# Patient Record
Sex: FEMALE | Race: WHITE | NOT HISPANIC OR LATINO | Employment: FULL TIME | URBAN - METROPOLITAN AREA
[De-identification: names, ages, dates, MRNs, and addresses within clinical notes are randomized per-mention and may not be internally consistent; named-entity substitution may affect disease eponyms.]

---

## 2017-12-20 ENCOUNTER — OFFICE VISIT (OUTPATIENT)
Dept: OBSTETRICS AND GYNECOLOGY | Age: 47
End: 2017-12-20

## 2017-12-20 VITALS
SYSTOLIC BLOOD PRESSURE: 112 MMHG | HEIGHT: 65 IN | DIASTOLIC BLOOD PRESSURE: 64 MMHG | WEIGHT: 125 LBS | BODY MASS INDEX: 20.83 KG/M2

## 2017-12-20 DIAGNOSIS — Z01.419 WELL WOMAN EXAM WITH ROUTINE GYNECOLOGICAL EXAM: Primary | ICD-10-CM

## 2017-12-20 PROCEDURE — 99396 PREV VISIT EST AGE 40-64: CPT | Performed by: PHYSICIAN ASSISTANT

## 2017-12-20 NOTE — PROGRESS NOTES
Subjective     Chief Complaint   Patient presents with   • Annual Exam     PT HERE FOR ROUTINE AE AND MG. SHE IS DOING WELL WITH NO  COMPLAINTS.       History of Present Illness    Elizabeth García is a 47 y.o.  who presents for annual exam.    She is in town for the holidays, she is still living in [place] but moving back in the summer. She has no change in her med hx. She is staying active with biking and walking, but not running as much. Kids are 14, 12 and 9.  All girls. Oldest started her period and they are heavy. Mom doesn't want to do BCP but interested to know alternative ways to help her. We discussed routine ibuprofen.     She is a pt of Dr Keith    Her menses are regular every 28-30 days, lasting 4-7 days, dysmenorrhea none   Obstetric History:  OB History      Para Term  AB Living    3 2 2   3    SAB TAB Ectopic Multiple Live Births        3         Menstrual History:     Patient's last menstrual period was 2017 (exact date).         Current contraception: vasectomy  History of abnormal Pap smear:   Received Gardasil immunization: no  Perform regular self breast exam: yes - mthly  Family history of uterine or ovarian cancer: no  Family History of colon cancer: no  Family history of breast cancer: no    Mammogram: done today.  Colonoscopy: not indicated.  DEXA: not indicated.    Exercise: moderately active  Calcium/Vitamin D: adequate intake and supplement    The following portions of the patient's history were reviewed and updated as appropriate: allergies, current medications, past family history, past medical history, past social history, past surgical history and problem list.    Review of Systems   All other systems reviewed and are negative.      Review of Systems   Constitutional: Negative for fatigue.   Respiratory: Negative for shortness of breath.    Gastrointestinal: Negative for abdominal pain.   Genitourinary: Negative for dysuria.   Neurological: Negative for  "headaches.   Psychiatric/Behavioral: Negative for dysphoric mood.         Objective   Physical Exam    /64  Ht 165.1 cm (65\")  Wt 56.7 kg (125 lb)  LMP 12/01/2017 (Exact Date)  BMI 20.8 kg/m2    General:   alert, appears stated age, cachectic and cooperative   Neck: no adenopathy and thyroid normal to palpation   Heart: regular rate and rhythm   Lungs: clear to auscultation bilaterally   Abdomen: soft and nontender   Breast: inspection negative, no nipple discharge or bleeding, no masses or nodularity palpable   Vulva: normal   Vagina: normal mucosa, normal discharge   Cervix: no lesions   Uterus: normal size, non-tender   Adnexa: normal adnexa and no mass, fullness, tenderness   Rectal: not indicated     Assessment/Plan   Elizabeth was seen today for annual exam.    Diagnoses and all orders for this visit:    Well woman exam with routine gynecological exam  -     Pap IG, Rfx HPV ASCU - ThinPrep Vial, Cervix  -     CBC & Differential  -     Comprehensive Metabolic Panel  -     Lipid Panel  -     TSH  -     Vitamin B12  -     Folate        All questions answered.  Breast self exam technique reviewed and patient encouraged to perform self-exam monthly.  Discussed healthy lifestyle modifications.  Recommended 30 minutes of aerobic exercise five times per week.  Discussed calcium needs to prevent osteoporosis.    Disc Pap guidelines, she would like to do a pap this year  Check HM labs, she is going to establish with a PCP on Friday, Wong?  I advised her to connect with [facility] so she will have lab results available for her appt             "

## 2017-12-21 LAB
ALBUMIN SERPL-MCNC: 4.9 G/DL (ref 3.5–5.5)
ALBUMIN/GLOB SERPL: 2 {RATIO} (ref 1.2–2.2)
ALP SERPL-CCNC: 61 IU/L (ref 39–117)
ALT SERPL-CCNC: 10 IU/L (ref 0–32)
AST SERPL-CCNC: 21 IU/L (ref 0–40)
BASOPHILS # BLD AUTO: 0 X10E3/UL (ref 0–0.2)
BASOPHILS NFR BLD AUTO: 1 %
BILIRUB SERPL-MCNC: 0.8 MG/DL (ref 0–1.2)
BUN SERPL-MCNC: 14 MG/DL (ref 6–24)
BUN/CREAT SERPL: 18 (ref 9–23)
CALCIUM SERPL-MCNC: 9.4 MG/DL (ref 8.7–10.2)
CHLORIDE SERPL-SCNC: 104 MMOL/L (ref 96–106)
CHOLEST SERPL-MCNC: 198 MG/DL (ref 100–199)
CO2 SERPL-SCNC: 14 MMOL/L (ref 18–29)
CREAT SERPL-MCNC: 0.77 MG/DL (ref 0.57–1)
EOSINOPHIL # BLD AUTO: 0.1 X10E3/UL (ref 0–0.4)
EOSINOPHIL NFR BLD AUTO: 2 %
ERYTHROCYTE [DISTWIDTH] IN BLOOD BY AUTOMATED COUNT: 13.5 % (ref 12.3–15.4)
FOLATE SERPL-MCNC: >20 NG/ML
GLOBULIN SER CALC-MCNC: 2.4 G/DL (ref 1.5–4.5)
GLUCOSE SERPL-MCNC: 77 MG/DL (ref 65–99)
HCT VFR BLD AUTO: 45.3 % (ref 34–46.6)
HDLC SERPL-MCNC: 71 MG/DL
HGB BLD-MCNC: 16 G/DL (ref 11.1–15.9)
IMM GRANULOCYTES # BLD: 0 X10E3/UL (ref 0–0.1)
IMM GRANULOCYTES NFR BLD: 0 %
LDLC SERPL CALC-MCNC: 108 MG/DL (ref 0–99)
LYMPHOCYTES # BLD AUTO: 2.2 X10E3/UL (ref 0.7–3.1)
LYMPHOCYTES NFR BLD AUTO: 33 %
MCH RBC QN AUTO: 33.5 PG (ref 26.6–33)
MCHC RBC AUTO-ENTMCNC: 35.3 G/DL (ref 31.5–35.7)
MCV RBC AUTO: 95 FL (ref 79–97)
MONOCYTES # BLD AUTO: 0.6 X10E3/UL (ref 0.1–0.9)
MONOCYTES NFR BLD AUTO: 9 %
NEUTROPHILS # BLD AUTO: 3.6 X10E3/UL (ref 1.4–7)
NEUTROPHILS NFR BLD AUTO: 55 %
PLATELET # BLD AUTO: 233 X10E3/UL (ref 150–379)
POTASSIUM SERPL-SCNC: 4.6 MMOL/L (ref 3.5–5.2)
PROT SERPL-MCNC: 7.3 G/DL (ref 6–8.5)
RBC # BLD AUTO: 4.77 X10E6/UL (ref 3.77–5.28)
SODIUM SERPL-SCNC: 143 MMOL/L (ref 134–144)
TRIGL SERPL-MCNC: 96 MG/DL (ref 0–149)
TSH SERPL DL<=0.005 MIU/L-ACNC: 1.58 UIU/ML (ref 0.45–4.5)
VIT B12 SERPL-MCNC: 1325 PG/ML (ref 232–1245)
VLDLC SERPL CALC-MCNC: 19 MG/DL (ref 5–40)
WBC # BLD AUTO: 6.5 X10E3/UL (ref 3.4–10.8)

## 2017-12-22 ENCOUNTER — TELEPHONE (OUTPATIENT)
Dept: OBSTETRICS AND GYNECOLOGY | Age: 47
End: 2017-12-22

## 2017-12-22 LAB
CONV .: NORMAL
CYTOLOGIST CVX/VAG CYTO: NORMAL
CYTOLOGY CVX/VAG DOC THIN PREP: NORMAL
DX ICD CODE: NORMAL
HIV 1 & 2 AB SER-IMP: NORMAL
OTHER STN SPEC: NORMAL
PATH REPORT.FINAL DX SPEC: NORMAL
STAT OF ADQ CVX/VAG CYTO-IMP: NORMAL

## 2017-12-22 NOTE — TELEPHONE ENCOUNTER
----- Message from KATHERINE Bansal sent at 12/22/2017 10:27 AM EST -----  Let her know her pap is wnl, her labs look good, CBC, CMP and thyroid, vitamin levels are all wnl.  Her B12 is high normal but not concerning. LDL level is slightly high, but lower then last year, I would just monitor diet and exercise and f/u with PCP

## 2020-01-29 ENCOUNTER — APPOINTMENT (OUTPATIENT)
Dept: WOMENS IMAGING | Facility: HOSPITAL | Age: 50
End: 2020-01-29

## 2020-01-29 ENCOUNTER — OFFICE VISIT (OUTPATIENT)
Dept: OBSTETRICS AND GYNECOLOGY | Age: 50
End: 2020-01-29

## 2020-01-29 ENCOUNTER — PROCEDURE VISIT (OUTPATIENT)
Dept: OBSTETRICS AND GYNECOLOGY | Age: 50
End: 2020-01-29

## 2020-01-29 VITALS
HEIGHT: 65 IN | WEIGHT: 126 LBS | BODY MASS INDEX: 20.99 KG/M2 | DIASTOLIC BLOOD PRESSURE: 62 MMHG | SYSTOLIC BLOOD PRESSURE: 102 MMHG

## 2020-01-29 DIAGNOSIS — Z12.4 SCREENING FOR CERVICAL CANCER: ICD-10-CM

## 2020-01-29 DIAGNOSIS — Z12.31 VISIT FOR SCREENING MAMMOGRAM: Primary | ICD-10-CM

## 2020-01-29 DIAGNOSIS — Z11.51 SCREENING FOR HPV (HUMAN PAPILLOMAVIRUS): ICD-10-CM

## 2020-01-29 DIAGNOSIS — N95.1 MENOPAUSAL SYMPTOMS: ICD-10-CM

## 2020-01-29 DIAGNOSIS — Z01.419 ENCOUNTER FOR GYNECOLOGICAL EXAMINATION (GENERAL) (ROUTINE) WITHOUT ABNORMAL FINDINGS: Primary | ICD-10-CM

## 2020-01-29 PROCEDURE — 99396 PREV VISIT EST AGE 40-64: CPT | Performed by: OBSTETRICS & GYNECOLOGY

## 2020-01-29 PROCEDURE — 77067 SCR MAMMO BI INCL CAD: CPT | Performed by: OBSTETRICS & GYNECOLOGY

## 2020-01-29 PROCEDURE — 77067 SCR MAMMO BI INCL CAD: CPT | Performed by: RADIOLOGY

## 2020-01-29 RX ORDER — VALACYCLOVIR HYDROCHLORIDE 1 G/1
TABLET, FILM COATED ORAL
COMMUNITY
Start: 2020-01-28 | End: 2022-07-07 | Stop reason: SDUPTHER

## 2020-01-29 RX ORDER — ESCITALOPRAM OXALATE 10 MG/1
10 TABLET ORAL DAILY
Qty: 30 TABLET | Refills: 2 | Status: SHIPPED | OUTPATIENT
Start: 2020-01-29 | End: 2021-01-28

## 2020-01-29 NOTE — PROGRESS NOTES
Routine Annual Visit    2020    Patient: Elizabeth García          MR#:[id number]      Chief Complaint   Patient presents with   • Gynecologic Exam     AE AND MG TODAY AFTER EXAM.   PAP = NEG. LAST CYCLE IN OCTOBER.  SYMPTOMS OF MENOPAUSE.  THREE GIRLS, DWAIN, NICHOLAS, AND JULES, 16, 14, AND 11.         History of Present Illness    49 y.o. female  who presents for annual exam.   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  Obstetric History:  OB History        3    Para   2    Term   2            AB        Living   3       SAB        TAB        Ectopic        Molar        Multiple        Live Births   3               Menstrual History:     Patient's last menstrual period was 10/16/2019.       Sexual History:       ________________________________________  There is no problem list on file for this patient.      Past Medical History:   Diagnosis Date   • Vaginal delivery        Past Surgical History:   Procedure Laterality Date   • APPENDECTOMY     • ELBOW ARTHROSCOPY     • FOOT SURGERY         Social History     Tobacco Use   Smoking Status Former Smoker   Smokeless Tobacco Never Used       has a current medication list which includes the following prescription(s): valacyclovir and escitalopram.  ________________________________________    Current contraception: vasectomy  History of abnormal Pap smear: no  Family history of Breast cancer: no  Family history of uterine or ovarian cancer: no  Family History of colon cancer/colon polyps: no  History of abnormal mammogram: no      The  "following portions of the patient's history were reviewed and updated as appropriate: allergies, current medications, past family history, past medical history, past social history, past surgical history and problem list.    Review of Systems    Pertinent items are noted in HPI.     Objective   Physical Exam    /62   Ht 165.1 cm (65\")   Wt 57.2 kg (126 lb)   LMP 10/16/2019   Breastfeeding No   BMI 20.97 kg/m²    BP Readings from Last 3 Encounters:   01/29/20 102/62   12/20/17 112/64   12/19/16 102/60      Wt Readings from Last 3 Encounters:   01/29/20 57.2 kg (126 lb)   12/20/17 56.7 kg (125 lb)   12/19/16 57.2 kg (126 lb)      BMI: Estimated body mass index is 20.97 kg/m² as calculated from the following:    Height as of this encounter: 165.1 cm (65\").    Weight as of this encounter: 57.2 kg (126 lb).      General:   alert, appears stated age and cooperative   Abdomen: soft, non-tender, without masses or organomegaly   Breast: inspection negative, no nipple discharge or bleeding, no masses or nodularity palpable   Vulva: normal   Vagina: normal mucosa   Cervix: no cervical motion tenderness and no lesions   Uterus: normal size, mobile or non-tender   Adnexa: no mass, fullness, tenderness     Assessment:    1. Normal annual exam   Assessment     ICD-10-CM ICD-9-CM   1. Encounter for gynecological examination (general) (routine) without abnormal findings Z01.419 V72.31   2. Menopausal symptoms N95.1 627.2   3. Screening for HPV (human papillomavirus) Z11.51 V73.81   4. Screening for cervical cancer Z12.4 V76.2     Plan:    Plan     [x]  Mammogram request made  [x]  PAP done  [x]  Labs:   []  GC/Chl/TV  []  DEXA scan   []  Referral for colonoscopy:       Elizabeth was seen today for gynecologic exam.    Diagnoses and all orders for this visit:    Encounter for gynecological examination (general) (routine) without abnormal findings    Menopausal symptoms  -     Follicle Stimulating Hormone    Screening for HPV " (human papillomavirus)  -     IGP, Apt HPV,rfx 16 / 18,45    Screening for cervical cancer  -     IGP, Apt HPV,rfx 16 / 18,45    Other orders  -     escitalopram (LEXAPRO) 10 MG tablet; Take 1 tablet by mouth Daily.            Counseling:  --Nutrition: Stressed importance of moderation and caloric balance, stressed fresh fruit and vegetables  --Exercise: Stressed the importance of regular exercise. 3-5 times weekly   - Discussed screening mammogram recommendations.   --Discussed benefits of screening colonoscopy- age 45 unless FH  --Discussed pap smear screening recommendations

## 2020-01-30 LAB — FSH SERPL-ACNC: 79.9 MIU/ML

## 2020-01-31 LAB
CYTOLOGIST CVX/VAG CYTO: NORMAL
CYTOLOGY CVX/VAG DOC CYTO: NORMAL
CYTOLOGY CVX/VAG DOC THIN PREP: NORMAL
DX ICD CODE: NORMAL
HIV 1 & 2 AB SER-IMP: NORMAL
HPV I/H RISK 4 DNA CVX QL PROBE+SIG AMP: NEGATIVE
OTHER STN SPEC: NORMAL
STAT OF ADQ CVX/VAG CYTO-IMP: NORMAL

## 2020-02-03 ENCOUNTER — TELEPHONE (OUTPATIENT)
Dept: OBSTETRICS AND GYNECOLOGY | Age: 50
End: 2020-02-03

## 2020-04-01 ENCOUNTER — TELEPHONE (OUTPATIENT)
Dept: OBSTETRICS AND GYNECOLOGY | Age: 50
End: 2020-04-01

## 2020-04-01 NOTE — TELEPHONE ENCOUNTER
(Sagar pt) Pt called requesting to start HRT and she has not ever been on anything for it before. Would you like patient to schedule an apt or can you call something in?Pharmacy is versified.  Pt states you all discussed the different options at her last appt.    [phone]

## 2020-04-01 NOTE — TELEPHONE ENCOUNTER
Menopausal symptoms  Rage, trouble sleeping, low sex drive, hot flashes and night sweats  Discussed HRT- RBA and SE  Will try prempro  Discussed ocps as pt is young and may be intermittently menopausal   Pt prefers to start with prempro

## 2020-04-01 NOTE — TELEPHONE ENCOUNTER
Pt called back asking about HRT Pellets Vs. The oral tablets/patches. Her aunt has the pellets that are inserted under your skin and she highly recommends them, would like you opinion as well.

## 2021-02-26 ENCOUNTER — APPOINTMENT (OUTPATIENT)
Dept: WOMENS IMAGING | Facility: HOSPITAL | Age: 51
End: 2021-02-26

## 2021-02-26 ENCOUNTER — PROCEDURE VISIT (OUTPATIENT)
Dept: OBSTETRICS AND GYNECOLOGY | Age: 51
End: 2021-02-26

## 2021-02-26 ENCOUNTER — OFFICE VISIT (OUTPATIENT)
Dept: OBSTETRICS AND GYNECOLOGY | Age: 51
End: 2021-02-26

## 2021-02-26 VITALS
DIASTOLIC BLOOD PRESSURE: 64 MMHG | SYSTOLIC BLOOD PRESSURE: 106 MMHG | BODY MASS INDEX: 23.05 KG/M2 | HEIGHT: 64 IN | WEIGHT: 135 LBS

## 2021-02-26 DIAGNOSIS — Z12.31 VISIT FOR SCREENING MAMMOGRAM: Primary | ICD-10-CM

## 2021-02-26 DIAGNOSIS — Z01.419 ENCOUNTER FOR GYNECOLOGICAL EXAMINATION: Primary | ICD-10-CM

## 2021-02-26 PROCEDURE — 77067 SCR MAMMO BI INCL CAD: CPT | Performed by: RADIOLOGY

## 2021-02-26 PROCEDURE — 77067 SCR MAMMO BI INCL CAD: CPT | Performed by: OBSTETRICS & GYNECOLOGY

## 2021-02-26 PROCEDURE — 99396 PREV VISIT EST AGE 40-64: CPT | Performed by: NURSE PRACTITIONER

## 2021-02-26 NOTE — PROGRESS NOTES
Subjective       History of Present Illness    Chief Complaint   Patient presents with   • Gynecologic Exam     annual exam last pap 2020 neg/neg, m/g today, last period 10/2019       Elizabeth García is a 50 y.o. female who presents for annual exam.  No cycle in over a year  No HF or night sweats, she never took HRT that was prescribed and doesn't feel like she needs it  No bowel or bladder problems  She has a hx of high cholesterol but hasn't had it checked in a long time  She would like to do labs today and will follow up with PCP    OB History    Para Term  AB Living   3 2 2     3   SAB TAB Ectopic Molar Multiple Live Births             3      # Outcome Date GA Lbr Clyde/2nd Weight Sex Delivery Anes PTL Lv   3 Term 08   4026 g (8 lb 14 oz) F Vag-Spont   [facility]   2  06   3742 g (8 lb 4 oz) F Vag-Spont   [facility]      Complications: Cleft palate   1 Term 03   3606 g (7 lb 15.2 oz) F Vag-Spont   [facility]       The following portions of the patient's history were reviewed and updated as appropriate: allergies, current medications, past family history, past medical history, past social history, past surgical history and problem list.    Current contraception: vasectomy  History of abnormal Pap smear: no  Family history of Breast cancer: no  Family history of uterine or ovarian cancer: no  Family History of colon cancer/colon polyps: no  History of abnormal mammogram: no    Mammogram: done today.  Colonoscopy: not indicated.  DEXA: not indicated.  Last Pap: neg/neg    Social History    Tobacco Use      Smoking status: Former Smoker      Smokeless tobacco: Never Used    Exercise: moderately active  Calcium/Vitamin D: adequate intake    The following portions of the patient's history were reviewed and updated as appropriate: allergies, current medications, past family history, past medical history, past social history, past surgical history and problem list.    Review of Systems  "  Constitutional: Negative.    HENT: Negative.    Eyes: Negative.    Respiratory: Negative.    Cardiovascular: Negative.    Gastrointestinal: Negative.    Endocrine: Negative.    Genitourinary: Negative.    Musculoskeletal: Negative.    Skin: Negative.    Allergic/Immunologic: Negative.    Neurological: Negative.    Hematological: Negative.    Psychiatric/Behavioral: Negative.          Objective   Physical Exam  Vitals signs reviewed.   Constitutional:       Appearance: She is well-developed.   Neck:      Thyroid: No thyroid mass.   Cardiovascular:      Rate and Rhythm: Normal rate and regular rhythm.      Heart sounds: Normal heart sounds.   Pulmonary:      Effort: Pulmonary effort is normal.      Breath sounds: Normal breath sounds.   Chest:      Breasts:         Right: No mass, nipple discharge, skin change or tenderness.         Left: No mass, nipple discharge, skin change or tenderness.   Abdominal:      Palpations: Abdomen is soft.      Tenderness: There is no abdominal tenderness.   Genitourinary:     Labia:         Right: No rash or lesion.         Left: No rash or lesion.       Vagina: Normal.      Cervix: No cervical motion tenderness, discharge or friability.      Adnexa:         Right: No mass or tenderness.          Left: No mass or tenderness.     Neurological:      Mental Status: She is alert and oriented to person, place, and time.   Psychiatric:         Behavior: Behavior normal.         /64   Ht 162.6 cm (64\")   Wt 61.2 kg (135 lb)   LMP 10/16/2019   Breastfeeding No   BMI 23.17 kg/m²     Assessment/Plan   Diagnoses and all orders for this visit:    1. Encounter for gynecological examination (Primary)  -     Lipid Panel  -     TSH  -     CBC & Differential  -     Comprehensive Metabolic Panel  -     Vitamin D 25 Hydroxy        Breast self exam technique reviewed and patient encouraged to perform self-exam monthly.  Discussed healthy lifestyle modifications.  Pap smear up to " date  Recommended 30 minutes of aerobic exercise five times per week.  Discussed calcium needs to prevent osteoporosis  Preventative labs sent

## 2021-02-27 LAB
25(OH)D3+25(OH)D2 SERPL-MCNC: 27.4 NG/ML (ref 30–100)
ALBUMIN SERPL-MCNC: 4.9 G/DL (ref 3.8–4.8)
ALBUMIN/GLOB SERPL: 2.3 {RATIO} (ref 1.2–2.2)
ALP SERPL-CCNC: 89 IU/L (ref 39–117)
ALT SERPL-CCNC: 21 IU/L (ref 0–32)
AST SERPL-CCNC: 32 IU/L (ref 0–40)
BASOPHILS # BLD AUTO: 0.1 X10E3/UL (ref 0–0.2)
BASOPHILS NFR BLD AUTO: 1 %
BILIRUB SERPL-MCNC: 1.2 MG/DL (ref 0–1.2)
BUN SERPL-MCNC: 9 MG/DL (ref 6–24)
BUN/CREAT SERPL: 12 (ref 9–23)
CALCIUM SERPL-MCNC: 9.4 MG/DL (ref 8.7–10.2)
CHLORIDE SERPL-SCNC: 103 MMOL/L (ref 96–106)
CHOLEST SERPL-MCNC: 209 MG/DL (ref 100–199)
CO2 SERPL-SCNC: 23 MMOL/L (ref 20–29)
CREAT SERPL-MCNC: 0.77 MG/DL (ref 0.57–1)
EOSINOPHIL # BLD AUTO: 0.1 X10E3/UL (ref 0–0.4)
EOSINOPHIL NFR BLD AUTO: 2 %
ERYTHROCYTE [DISTWIDTH] IN BLOOD BY AUTOMATED COUNT: 12.6 % (ref 11.7–15.4)
GLOBULIN SER CALC-MCNC: 2.1 G/DL (ref 1.5–4.5)
GLUCOSE SERPL-MCNC: 64 MG/DL (ref 65–99)
HCT VFR BLD AUTO: 43.3 % (ref 34–46.6)
HDLC SERPL-MCNC: 83 MG/DL
HGB BLD-MCNC: 14.6 G/DL (ref 11.1–15.9)
IMM GRANULOCYTES # BLD AUTO: 0 X10E3/UL (ref 0–0.1)
IMM GRANULOCYTES NFR BLD AUTO: 0 %
LDLC SERPL CALC-MCNC: 112 MG/DL (ref 0–99)
LYMPHOCYTES # BLD AUTO: 1.6 X10E3/UL (ref 0.7–3.1)
LYMPHOCYTES NFR BLD AUTO: 31 %
MCH RBC QN AUTO: 32.8 PG (ref 26.6–33)
MCHC RBC AUTO-ENTMCNC: 33.7 G/DL (ref 31.5–35.7)
MCV RBC AUTO: 97 FL (ref 79–97)
MONOCYTES # BLD AUTO: 0.4 X10E3/UL (ref 0.1–0.9)
MONOCYTES NFR BLD AUTO: 8 %
NEUTROPHILS # BLD AUTO: 3.2 X10E3/UL (ref 1.4–7)
NEUTROPHILS NFR BLD AUTO: 58 %
PLATELET # BLD AUTO: 260 X10E3/UL (ref 150–450)
POTASSIUM SERPL-SCNC: 4.1 MMOL/L (ref 3.5–5.2)
PROT SERPL-MCNC: 7 G/DL (ref 6–8.5)
RBC # BLD AUTO: 4.45 X10E6/UL (ref 3.77–5.28)
SODIUM SERPL-SCNC: 142 MMOL/L (ref 134–144)
TRIGL SERPL-MCNC: 81 MG/DL (ref 0–149)
TSH SERPL DL<=0.005 MIU/L-ACNC: 1.18 UIU/ML (ref 0.45–4.5)
VLDLC SERPL CALC-MCNC: 14 MG/DL (ref 5–40)
WBC # BLD AUTO: 5.3 X10E3/UL (ref 3.4–10.8)

## 2021-04-06 ENCOUNTER — BULK ORDERING (OUTPATIENT)
Dept: CASE MANAGEMENT | Facility: OTHER | Age: 51
End: 2021-04-06

## 2021-04-06 DIAGNOSIS — Z23 IMMUNIZATION DUE: ICD-10-CM

## 2021-07-02 ENCOUNTER — TELEPHONE (OUTPATIENT)
Dept: OBSTETRICS AND GYNECOLOGY | Age: 51
End: 2021-07-02

## 2021-07-02 NOTE — TELEPHONE ENCOUNTER
Sagar pt    Pt called stating that she has been talking to dr peña about her mental health. Pt stated that she would like a Rx called for Prozac 20 mg. Pharmacy verified. Please advise    [phone]

## 2021-07-06 ENCOUNTER — TELEPHONE (OUTPATIENT)
Dept: OBSTETRICS AND GYNECOLOGY | Age: 51
End: 2021-07-06

## 2021-07-06 NOTE — TELEPHONE ENCOUNTER
Per the last phone call on 7/2, pt was requesting Prozac 20mg but NP advised her to contact her PCP

## 2021-07-06 NOTE — TELEPHONE ENCOUNTER
Pt calls stating she would like to try Welburin for her menopausal depression if it can be called in to her pharmacy. Please advise pharmacy verified

## 2021-07-08 ENCOUNTER — TELEPHONE (OUTPATIENT)
Dept: OBSTETRICS AND GYNECOLOGY | Age: 51
End: 2021-07-08

## 2021-07-08 RX ORDER — CITALOPRAM 20 MG/1
20 TABLET ORAL DAILY
Qty: 30 TABLET | Refills: 2 | Status: SHIPPED | OUTPATIENT
Start: 2021-07-08 | End: 2022-07-07

## 2021-07-08 NOTE — TELEPHONE ENCOUNTER
Spoke with pt,   Depression- dysthymia and anxiety, stress in personal life , able to function  No hot flashes or NS, sleeps well  Got light headed with HRT (2 days)  Researching antidepressants  Has never take before, discussed with pt , will try celexa - reviewed plan for 3 months, WCB if not helping or SE

## 2021-07-15 ENCOUNTER — TELEPHONE (OUTPATIENT)
Dept: OBSTETRICS AND GYNECOLOGY | Age: 51
End: 2021-07-15

## 2021-07-15 DIAGNOSIS — Z78.0 MENOPAUSE: Primary | ICD-10-CM

## 2021-07-15 RX ORDER — ESTERIFIED ESTROGEN AND METHYLTESTOSTERONE .625; 1.25 MG/1; MG/1
1 TABLET ORAL DAILY
Qty: 30 TABLET | Refills: 3 | Status: SHIPPED | OUTPATIENT
Start: 2021-07-15 | End: 2021-07-16

## 2021-07-15 RX ORDER — PROGESTERONE 100 MG/1
100 CAPSULE ORAL DAILY
Qty: 30 CAPSULE | Refills: 3 | Status: SHIPPED | OUTPATIENT
Start: 2021-07-15 | End: 2022-06-16

## 2021-07-15 NOTE — TELEPHONE ENCOUNTER
Sagar pt    Pt called stating that she would like a call back from tomasz peña to discuss her hormones

## 2021-07-15 NOTE — TELEPHONE ENCOUNTER
Spoke with pt, wants to try HRT again  Prefers to try estratest  Did hormone testing   Low estrogen and testosterone  Taking celexa  And that is going ok

## 2021-07-16 ENCOUNTER — TELEPHONE (OUTPATIENT)
Dept: OBSTETRICS AND GYNECOLOGY | Age: 51
End: 2021-07-16

## 2021-07-16 RX ORDER — ESTRADIOL 0.1 MG/D
1 FILM, EXTENDED RELEASE TRANSDERMAL 2 TIMES WEEKLY
Qty: 8 PATCH | Refills: 12 | Status: SHIPPED | OUTPATIENT
Start: 2021-07-19 | End: 2022-06-16

## 2021-07-16 NOTE — TELEPHONE ENCOUNTER
Sagar Pt    Pt called stating she was prescribed the   estratest and would like the estrogen patch called in instead due to cost.     Pharmacy verified     Please advise     [phone]

## 2022-04-15 ENCOUNTER — APPOINTMENT (OUTPATIENT)
Dept: WOMENS IMAGING | Facility: HOSPITAL | Age: 52
End: 2022-04-15

## 2022-04-15 ENCOUNTER — PROCEDURE VISIT (OUTPATIENT)
Dept: OBSTETRICS AND GYNECOLOGY | Age: 52
End: 2022-04-15

## 2022-04-15 DIAGNOSIS — Z12.31 VISIT FOR SCREENING MAMMOGRAM: Primary | ICD-10-CM

## 2022-04-15 PROCEDURE — 77063 BREAST TOMOSYNTHESIS BI: CPT | Performed by: RADIOLOGY

## 2022-04-15 PROCEDURE — 77063 BREAST TOMOSYNTHESIS BI: CPT | Performed by: OBSTETRICS & GYNECOLOGY

## 2022-04-15 PROCEDURE — 77067 SCR MAMMO BI INCL CAD: CPT | Performed by: OBSTETRICS & GYNECOLOGY

## 2022-04-15 PROCEDURE — 77067 SCR MAMMO BI INCL CAD: CPT | Performed by: RADIOLOGY

## 2022-06-14 ENCOUNTER — TELEPHONE (OUTPATIENT)
Dept: OBSTETRICS AND GYNECOLOGY | Age: 52
End: 2022-06-14

## 2022-06-14 NOTE — TELEPHONE ENCOUNTER
Patient would like to discus going on HRT, estradiol w/ progesterone. Patient states that she used to be on the patch and on progesterone. She is hoping that the oral option will work better. She states she's having Sx of irritability and brain fog.  Patient is aware that provider is out of office until 6/16/22. AE rescheduled from 6/20/22 to 7/7/22 as provider is out of offices. No other questions at this time.

## 2022-06-16 RX ORDER — MEDROXYPROGESTERONE ACETATE 2.5 MG/1
2.5 TABLET ORAL DAILY
Qty: 30 TABLET | Refills: 1 | Status: SHIPPED | OUTPATIENT
Start: 2022-06-16 | End: 2022-07-07

## 2022-06-16 RX ORDER — ESTRADIOL 1 MG/1
1 TABLET ORAL DAILY
Qty: 30 TABLET | Refills: 1 | Status: SHIPPED | OUTPATIENT
Start: 2022-06-16 | End: 2022-07-07

## 2022-06-16 NOTE — TELEPHONE ENCOUNTER
Let her know I sent in an oral estrogen tablet and also a different progesterone tablet to try together.  If she starts now we can see if she is feeling better by her appt.

## 2022-07-07 ENCOUNTER — OFFICE VISIT (OUTPATIENT)
Dept: OBSTETRICS AND GYNECOLOGY | Age: 52
End: 2022-07-07

## 2022-07-07 VITALS — SYSTOLIC BLOOD PRESSURE: 118 MMHG | BODY MASS INDEX: 22.66 KG/M2 | WEIGHT: 132 LBS | DIASTOLIC BLOOD PRESSURE: 62 MMHG

## 2022-07-07 DIAGNOSIS — Z12.11 SCREEN FOR COLON CANCER: ICD-10-CM

## 2022-07-07 DIAGNOSIS — Z11.51 SCREENING FOR HPV (HUMAN PAPILLOMAVIRUS): ICD-10-CM

## 2022-07-07 DIAGNOSIS — Z01.419 ENCOUNTER FOR GYNECOLOGICAL EXAMINATION WITHOUT ABNORMAL FINDING: Primary | ICD-10-CM

## 2022-07-07 DIAGNOSIS — Z12.4 SCREENING FOR CERVICAL CANCER: ICD-10-CM

## 2022-07-07 PROCEDURE — 99396 PREV VISIT EST AGE 40-64: CPT | Performed by: OBSTETRICS & GYNECOLOGY

## 2022-07-07 RX ORDER — ONDANSETRON 8 MG/1
TABLET, ORALLY DISINTEGRATING ORAL
COMMUNITY
Start: 2022-04-22

## 2022-07-07 RX ORDER — VALACYCLOVIR HYDROCHLORIDE 1 G/1
1000 TABLET, FILM COATED ORAL DAILY
Qty: 30 TABLET | Refills: 12 | Status: SHIPPED | OUTPATIENT
Start: 2022-07-07

## 2022-07-07 NOTE — PROGRESS NOTES
Routine Annual Visit    2022    Patient: Elizabeth García          MR#:[id number]      Chief Complaint   Patient presents with   • Gynecologic Exam     Last Pap 20 (-), Last Mammo4/15/22, No concerns at this time       History of Present Illness    51 y.o. female  who presents for annual exam.   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 Kids well, job ok    Discussed exercise          Patient's last menstrual period was 10/16/2019 (exact date).  Obstetric History:  OB History        3    Para   2    Term   2            AB        Living   3       SAB        IAB        Ectopic        Molar        Multiple        Live Births   3               Menstrual History:     Patient's last menstrual period was 10/16/2019 (exact date).       Sexual History:       ________________________________________  There is no problem list on file for this patient.      Past Medical History:   Diagnosis Date   • Vaginal delivery        Past Surgical History:   Procedure Laterality Date   • APPENDECTOMY     • ELBOW ARTHROSCOPY     • FOOT SURGERY         Social History     Tobacco Use   Smoking Status Former Smoker   Smokeless Tobacco Never Used       has a current medication list which includes the following prescription(s): valacyclovir and ondansetron odt.  ________________________________________    Current contraception: vasectomy  History of abnormal Pap smear: no  Family history of Breast cancer: no  Family history of uterine or ovarian cancer: no  Family History of colon cancer/colon polyps: yes  History of abnormal mammogram: no      The following portions of the patient's history were reviewed and updated as appropriate: allergies, current medications, past family history, past medical history, past social history, past surgical history and problem  "list.    Review of Systems    Pertinent items are noted in HPI.     Objective   Physical Exam    /62   Wt 59.9 kg (132 lb)   LMP 10/16/2019 (Exact Date)   Breastfeeding No   BMI 22.66 kg/m²    BP Readings from Last 3 Encounters:   07/07/22 118/62   02/26/21 106/64   01/29/20 102/62      Wt Readings from Last 3 Encounters:   07/07/22 59.9 kg (132 lb)   02/26/21 61.2 kg (135 lb)   01/29/20 57.2 kg (126 lb)      BMI: Estimated body mass index is 22.66 kg/m² as calculated from the following:    Height as of 2/26/21: 162.6 cm (64\").    Weight as of this encounter: 59.9 kg (132 lb).      General:   alert, appears stated age and cooperative   Abdomen: soft, non-tender, without masses or organomegaly   Breast: inspection negative, no nipple discharge or bleeding, no masses or nodularity palpable   Vulva: normal   Vagina: normal mucosa   Cervix: no cervical motion tenderness and no lesions   Uterus: normal size, mobile and non-tender   Adnexa: no mass, fullness, tenderness     Assessment:    1. Normal annual exam   Assessment     ICD-10-CM ICD-9-CM   1. Encounter for gynecological examination without abnormal finding  Z01.419 V72.31   2. Screening for cervical cancer  Z12.4 V76.2   3. Screening for HPV (human papillomavirus)  Z11.51 V73.81   4. Screen for colon cancer  Z12.11 V76.51     Plan:    Plan     []  Mammogram request made  [x]  PAP done  []  Labs:   []  GC/Chl/TV  []  DEXA scan   [x]  Referral for colonoscopy:       Diagnoses and all orders for this visit:    1. Encounter for gynecological examination without abnormal finding (Primary)    2. Screening for cervical cancer  -     IGP, Apt HPV,rfx 16 / 18,45    3. Screening for HPV (human papillomavirus)  -     IGP, Apt HPV,rfx 16 / 18,45    4. Screen for colon cancer  -     Ambulatory Referral For Screening Colonoscopy    Other orders  -     valACYclovir (VALTREX) 1000 MG tablet; Take 1 tablet by mouth Daily.  Dispense: 30 tablet; Refill: " 12            Counseling:  --Nutrition: Stressed importance of moderation and caloric balance, stressed fresh fruit and vegetables  --Exercise: Stressed the importance of regular exercise. 3-5 times weekly   - Discussed screening mammogram recommendations.   --Discussed benefits of screening colonoscopy- age 45 unless FH  --Discussed pap smear screening recommendations

## 2023-02-13 ENCOUNTER — TELEPHONE (OUTPATIENT)
Dept: OBSTETRICS AND GYNECOLOGY | Age: 53
End: 2023-02-13
Payer: COMMERCIAL

## 2023-02-13 NOTE — TELEPHONE ENCOUNTER
Pt returning phone call stating the medication is estrogel 0.06%. Pt is requesting this to be filled today if possible. Please advise if you will send in medication for pt?

## 2023-02-13 NOTE — TELEPHONE ENCOUNTER
Pt requesting to try an Estrogen gel for menopause symptoms.Pt aware jose g are out of office till tuesday

## 2023-02-14 RX ORDER — MEDROXYPROGESTERONE ACETATE 2.5 MG/1
2.5 TABLET ORAL DAILY
Qty: 90 TABLET | Refills: 2 | Status: SHIPPED | OUTPATIENT
Start: 2023-02-14

## 2023-02-14 RX ORDER — ESTRADIOL 0.75 MG/1.25G
1.25 GEL, METERED TOPICAL DAILY
Qty: 50 G | Refills: 8 | Status: SHIPPED | OUTPATIENT
Start: 2023-02-14 | End: 2023-03-27 | Stop reason: SDUPTHER

## 2023-03-01 ENCOUNTER — TELEPHONE (OUTPATIENT)
Dept: OBSTETRICS AND GYNECOLOGY | Age: 53
End: 2023-03-01
Payer: COMMERCIAL

## 2023-03-01 NOTE — TELEPHONE ENCOUNTER
Patient called and stated she recently started the estrogen gel prescribed by you, and is working well. She said she is very tired and low energy and low libido, and would like to know if she could be prescribed a testosterone gel as well? She stated prior to taking the estrogen gel she was taking a estrogen/testosterone pellets and felt great. [facility] pharmacy has been verified on file. She is aware you return to the office tomorrow.

## 2023-03-02 ENCOUNTER — TELEPHONE (OUTPATIENT)
Dept: OBSTETRICS AND GYNECOLOGY | Age: 53
End: 2023-03-02
Payer: COMMERCIAL

## 2023-03-02 NOTE — TELEPHONE ENCOUNTER
Felt great on estrogen and testosterone   Takes compounded capsule of progesterone  Taking estogel now and stopped pellets, worried about dose variation with pellets  Reviewed testosterone, not approved for libido , SE profile and concerns  Pt felt great with testosterone in mix and would like to try appropriate formulation  Discussed testosterone cream vs estrotest  Pt prefers topical  Will start pea sized amount daily inner thigh   Can taper to every other day,   Order sent to [facility]  Will call with any SE concerns

## 2023-03-27 RX ORDER — ESTRADIOL 0.75 MG/1.25G
1.25 GEL, METERED TOPICAL DAILY
Qty: 50 G | Refills: 8 | Status: SHIPPED | OUTPATIENT
Start: 2023-03-27 | End: 2023-03-30

## 2023-03-30 ENCOUNTER — TELEPHONE (OUTPATIENT)
Dept: OBSTETRICS AND GYNECOLOGY | Age: 53
End: 2023-03-30
Payer: COMMERCIAL

## 2023-03-30 RX ORDER — ESTRADIOL 0.1 MG/D
1 FILM, EXTENDED RELEASE TRANSDERMAL 2 TIMES WEEKLY
Qty: 24 PATCH | Refills: 3 | Status: SHIPPED | OUTPATIENT
Start: 2023-03-30

## 2023-04-11 VITALS
TEMPERATURE: 97.8 F | TEMPERATURE: 97.5 F | DIASTOLIC BLOOD PRESSURE: 64 MMHG | OXYGEN SATURATION: 100 % | RESPIRATION RATE: 8 BRPM | TEMPERATURE: 97.2 F | SYSTOLIC BLOOD PRESSURE: 102 MMHG | SYSTOLIC BLOOD PRESSURE: 103 MMHG | DIASTOLIC BLOOD PRESSURE: 63 MMHG | RESPIRATION RATE: 16 BRPM | SYSTOLIC BLOOD PRESSURE: 89 MMHG | SYSTOLIC BLOOD PRESSURE: 86 MMHG | OXYGEN SATURATION: 96 % | DIASTOLIC BLOOD PRESSURE: 61 MMHG | HEART RATE: 68 BPM | OXYGEN SATURATION: 99 % | HEIGHT: 65 IN | HEART RATE: 70 BPM | DIASTOLIC BLOOD PRESSURE: 45 MMHG | RESPIRATION RATE: 19 BRPM | DIASTOLIC BLOOD PRESSURE: 62 MMHG | RESPIRATION RATE: 15 BRPM | HEART RATE: 64 BPM | SYSTOLIC BLOOD PRESSURE: 92 MMHG | RESPIRATION RATE: 17 BRPM | SYSTOLIC BLOOD PRESSURE: 101 MMHG | HEART RATE: 62 BPM | HEART RATE: 65 BPM | RESPIRATION RATE: 18 BRPM | WEIGHT: 130 LBS | HEART RATE: 66 BPM | OXYGEN SATURATION: 98 % | DIASTOLIC BLOOD PRESSURE: 56 MMHG

## 2023-04-13 ENCOUNTER — AMBULATORY SURGICAL CENTER (AMBULATORY)
Dept: URBAN - METROPOLITAN AREA SURGERY 17 | Facility: SURGERY | Age: 53
End: 2023-04-13
Payer: COMMERCIAL

## 2023-04-13 DIAGNOSIS — Z12.11 ENCOUNTER FOR SCREENING FOR MALIGNANT NEOPLASM OF COLON: ICD-10-CM

## 2023-04-13 PROCEDURE — 45378 DIAGNOSTIC COLONOSCOPY: CPT | Mod: 33 | Performed by: INTERNAL MEDICINE

## 2023-12-01 ENCOUNTER — TELEPHONE (OUTPATIENT)
Dept: OBSTETRICS AND GYNECOLOGY | Age: 53
End: 2023-12-01
Payer: COMMERCIAL

## 2023-12-01 RX ORDER — MEDROXYPROGESTERONE ACETATE 2.5 MG/1
2.5 TABLET ORAL DAILY
Qty: 90 TABLET | Refills: 2 | Status: SHIPPED | OUTPATIENT
Start: 2023-12-01

## 2023-12-01 NOTE — TELEPHONE ENCOUNTER
Pt called back stating she uses the compound progesterone at 50mg capsule and does not use the synthetic and would like the progesterone called into [facility]     Please advise

## 2023-12-01 NOTE — TELEPHONE ENCOUNTER
Pt called needing a refill for her progesterone and then her Testerone that is sent to compound       Pt scheduled for AE Tuesday     Please advise

## 2023-12-05 ENCOUNTER — OFFICE VISIT (OUTPATIENT)
Dept: OBSTETRICS AND GYNECOLOGY | Age: 53
End: 2023-12-05
Payer: COMMERCIAL

## 2023-12-05 VITALS
BODY MASS INDEX: 21.85 KG/M2 | SYSTOLIC BLOOD PRESSURE: 100 MMHG | WEIGHT: 128 LBS | DIASTOLIC BLOOD PRESSURE: 68 MMHG | HEIGHT: 64 IN

## 2023-12-05 DIAGNOSIS — Z12.31 VISIT FOR SCREENING MAMMOGRAM: Primary | ICD-10-CM

## 2023-12-05 DIAGNOSIS — Z12.31 ENCOUNTER FOR SCREENING MAMMOGRAM FOR MALIGNANT NEOPLASM OF BREAST: ICD-10-CM

## 2023-12-05 DIAGNOSIS — Z13.29 SCREENING FOR THYROID DISORDER: ICD-10-CM

## 2023-12-05 DIAGNOSIS — Z13.0 SCREENING FOR IRON DEFICIENCY ANEMIA: ICD-10-CM

## 2023-12-05 DIAGNOSIS — Z00.00 HEALTHCARE MAINTENANCE: ICD-10-CM

## 2023-12-05 DIAGNOSIS — Z13.220 SCREENING FOR LIPID DISORDERS: ICD-10-CM

## 2023-12-05 DIAGNOSIS — Z01.419 WELL WOMAN EXAM WITH ROUTINE GYNECOLOGICAL EXAM: Primary | ICD-10-CM

## 2023-12-05 DIAGNOSIS — Z13.1 SCREENING FOR DIABETES MELLITUS: ICD-10-CM

## 2023-12-05 DIAGNOSIS — N95.1 MENOPAUSAL SYMPTOMS: ICD-10-CM

## 2023-12-05 NOTE — PROGRESS NOTES
Subjective     Chief Complaint   Patient presents with    Gynecologic Exam     Pt here for her annual. Pap 22 normal. Mammo 17.          History of Present Illness    Elizabeth García is a 52 y.o.  who presents for annual exam.    Doing well  She is using estradiol gel, provera and testosterone topical compounded  Had stopped all of this back in August but recently restarted  Wants hormones checked today and other baseline labs. She is fasting. Wants referral to new PCP (catie nunez)  She is postmenopausal - no bleeding  She has no other GYN concerns or complaints today    Obstetric History:  OB History          3    Para   2    Term   2            AB        Living   3         SAB        IAB        Ectopic        Molar        Multiple        Live Births   3               Menstrual History:     Patient's last menstrual period was 10/16/2019 (exact date).         Current contraception: post menopausal status  History of abnormal Pap smear: no  Received Gardasil immunization: no  Perform regular self breast exam: yes - .  Family history of uterine or ovarian cancer: no  Family History of colon cancer: yes - PGM  Family history of breast cancer: no    Mammogram: ordered.  Colonoscopy: up to date.  DEXA: not indicated.    Exercise: moderately active  Calcium/Vitamin D: adequate intake    The following portions of the patient's history were reviewed and updated as appropriate: allergies, current medications, past family history, past medical history, past social history, past surgical history, and problem list.    Review of Systems   Constitutional: Negative.    Respiratory: Negative.     Cardiovascular: Negative.    Gastrointestinal: Negative.    Genitourinary: Negative.    Skin: Negative.    Psychiatric/Behavioral: Negative.             Objective   Physical Exam  Constitutional:       General: She is awake.      Appearance: Normal appearance. She is well-developed.   HENT:      Head:  Normocephalic and atraumatic.      Nose: Nose normal.   Neck:      Thyroid: No thyroid mass, thyromegaly or thyroid tenderness.   Cardiovascular:      Rate and Rhythm: Normal rate and regular rhythm.      Pulses: Normal pulses.      Heart sounds: Normal heart sounds.   Pulmonary:      Effort: Pulmonary effort is normal.      Breath sounds: Normal breath sounds.   Chest:   Breasts:     Breasts are symmetrical.      Right: Normal. No swelling, bleeding, inverted nipple, mass, nipple discharge, skin change or tenderness.      Left: Normal. No swelling, bleeding, inverted nipple, mass, nipple discharge, skin change or tenderness.   Abdominal:      General: Abdomen is flat. Bowel sounds are normal.      Palpations: Abdomen is soft.      Tenderness: There is no abdominal tenderness.   Genitourinary:     General: Normal vulva.      Labia:         Right: No rash, tenderness, lesion or injury.         Left: No rash, tenderness, lesion or injury.       Urethra: No prolapse, urethral pain, urethral swelling or urethral lesion.      Vagina: Normal. No signs of injury. No vaginal discharge, erythema, tenderness, bleeding, lesions or prolapsed vaginal walls.      Cervix: Normal. No discharge, friability, lesion, erythema or cervical bleeding.      Uterus: Normal. Not enlarged, not tender and no uterine prolapse.       Adnexa: Right adnexa normal and left adnexa normal.        Right: No mass, tenderness or fullness.          Left: No mass, tenderness or fullness.        Rectum: Normal. No mass.   Musculoskeletal:      Cervical back: Normal range of motion and neck supple.   Lymphadenopathy:      Upper Body:      Right upper body: No supraclavicular adenopathy.      Left upper body: No supraclavicular adenopathy.   Skin:     General: Skin is warm and dry.   Neurological:      General: No focal deficit present.      Mental Status: She is alert and oriented to person, place, and time.   Psychiatric:         Mood and Affect: Mood  "normal.         Behavior: Behavior normal. Behavior is cooperative.         Thought Content: Thought content normal.         Judgment: Judgment normal.         /68   Ht 162.6 cm (64\")   Wt 58.1 kg (128 lb)   LMP 10/16/2019 (Exact Date)   BMI 21.97 kg/m²     Assessment & Plan   Diagnoses and all orders for this visit:    1. Well woman exam with routine gynecological exam (Primary)    2. Healthcare maintenance  -     Ambulatory Referral to Family Practice    3. Menopausal symptoms  -     Testosterone (Free & Total), LC / MS  -     Estradiol  -     Progesterone    4. Screening for diabetes mellitus  -     Comprehensive Metabolic Panel    5. Screening for lipid disorders  -     Lipid Panel    6. Screening for iron deficiency anemia  -     CBC & Differential    7. Screening for thyroid disorder  -     TSH Rfx On Abnormal To Free T4    8. Encounter for screening mammogram for malignant neoplasm of breast  -     Mammo Screening Digital Tomosynthesis Bilateral With CAD; Future        All questions answered.  Breast self exam technique reviewed and patient encouraged to perform self-exam monthly.  Discussed healthy lifestyle modifications.  Recommended 30 minutes of aerobic exercise five times per week.  Discussed calcium needs to prevent osteoporosis.    -Pap UTD  -Labs done  -Mammo ordered  -F/u 1 year               "

## 2023-12-09 LAB
ALBUMIN SERPL-MCNC: 5 G/DL (ref 3.5–5.2)
ALBUMIN/GLOB SERPL: 2.5 G/DL
ALP SERPL-CCNC: 65 U/L (ref 39–117)
ALT SERPL-CCNC: 15 U/L (ref 1–33)
AST SERPL-CCNC: 17 U/L (ref 1–32)
BASOPHILS # BLD AUTO: 0.07 10*3/MM3 (ref 0–0.2)
BASOPHILS NFR BLD AUTO: 1.1 % (ref 0–1.5)
BILIRUB SERPL-MCNC: 1.2 MG/DL (ref 0–1.2)
BUN SERPL-MCNC: 11 MG/DL (ref 6–20)
BUN/CREAT SERPL: 15.9 (ref 7–25)
CALCIUM SERPL-MCNC: 9.7 MG/DL (ref 8.6–10.5)
CHLORIDE SERPL-SCNC: 102 MMOL/L (ref 98–107)
CHOLEST SERPL-MCNC: 223 MG/DL (ref 0–200)
CO2 SERPL-SCNC: 25.7 MMOL/L (ref 22–29)
CREAT SERPL-MCNC: 0.69 MG/DL (ref 0.57–1)
EGFRCR SERPLBLD CKD-EPI 2021: 105.9 ML/MIN/1.73
EOSINOPHIL # BLD AUTO: 0.2 10*3/MM3 (ref 0–0.4)
EOSINOPHIL NFR BLD AUTO: 3 % (ref 0.3–6.2)
ERYTHROCYTE [DISTWIDTH] IN BLOOD BY AUTOMATED COUNT: 12.3 % (ref 12.3–15.4)
ESTRADIOL SERPL-MCNC: 98.1 PG/ML
GLOBULIN SER CALC-MCNC: 2 GM/DL
GLUCOSE SERPL-MCNC: 80 MG/DL (ref 65–99)
HCT VFR BLD AUTO: 42.4 % (ref 34–46.6)
HDLC SERPL-MCNC: 61 MG/DL (ref 40–60)
HGB BLD-MCNC: 14.4 G/DL (ref 12–15.9)
IMM GRANULOCYTES # BLD AUTO: 0.02 10*3/MM3 (ref 0–0.05)
IMM GRANULOCYTES NFR BLD AUTO: 0.3 % (ref 0–0.5)
LDLC SERPL CALC-MCNC: 136 MG/DL (ref 0–100)
LYMPHOCYTES # BLD AUTO: 2.12 10*3/MM3 (ref 0.7–3.1)
LYMPHOCYTES NFR BLD AUTO: 31.9 % (ref 19.6–45.3)
MCH RBC QN AUTO: 32.7 PG (ref 26.6–33)
MCHC RBC AUTO-ENTMCNC: 34 G/DL (ref 31.5–35.7)
MCV RBC AUTO: 96.4 FL (ref 79–97)
MONOCYTES # BLD AUTO: 0.53 10*3/MM3 (ref 0.1–0.9)
MONOCYTES NFR BLD AUTO: 8 % (ref 5–12)
NEUTROPHILS # BLD AUTO: 3.7 10*3/MM3 (ref 1.7–7)
NEUTROPHILS NFR BLD AUTO: 55.7 % (ref 42.7–76)
NRBC BLD AUTO-RTO: 0 /100 WBC (ref 0–0.2)
PLATELET # BLD AUTO: 286 10*3/MM3 (ref 140–450)
POTASSIUM SERPL-SCNC: 4.2 MMOL/L (ref 3.5–5.2)
PROGEST SERPL-MCNC: 0.1 NG/ML
PROT SERPL-MCNC: 7 G/DL (ref 6–8.5)
RBC # BLD AUTO: 4.4 10*6/MM3 (ref 3.77–5.28)
SODIUM SERPL-SCNC: 141 MMOL/L (ref 136–145)
TESTOST FREE SERPL-MCNC: 0.5 PG/ML (ref 0–4.2)
TESTOST SERPL-MCNC: 16.7 NG/DL
TRIGL SERPL-MCNC: 147 MG/DL (ref 0–150)
TSH SERPL DL<=0.005 MIU/L-ACNC: 1.77 UIU/ML (ref 0.27–4.2)
VLDLC SERPL CALC-MCNC: 26 MG/DL (ref 5–40)
WBC # BLD AUTO: 6.64 10*3/MM3 (ref 3.4–10.8)

## 2023-12-13 ENCOUNTER — HOSPITAL ENCOUNTER (OUTPATIENT)
Facility: HOSPITAL | Age: 53
Discharge: HOME | End: 2023-12-13
Admitting: NURSE PRACTITIONER
Payer: COMMERCIAL

## 2023-12-13 DIAGNOSIS — Z12.31 VISIT FOR SCREENING MAMMOGRAM: ICD-10-CM

## 2023-12-13 PROCEDURE — 77067 SCR MAMMO BI INCL CAD: CPT

## 2023-12-13 PROCEDURE — 77063 BREAST TOMOSYNTHESIS BI: CPT

## 2024-03-25 RX ORDER — VALACYCLOVIR HYDROCHLORIDE 1 G/1
1000 TABLET, FILM COATED ORAL DAILY
Qty: 30 TABLET | Refills: 12 | Status: SHIPPED | OUTPATIENT
Start: 2024-03-25

## 2024-07-17 RX ORDER — ESTRADIOL 0.1 MG/D
FILM, EXTENDED RELEASE TRANSDERMAL
Qty: 24 PATCH | Refills: 3 | Status: SHIPPED | OUTPATIENT
Start: 2024-07-17

## 2024-08-28 ENCOUNTER — TELEPHONE (OUTPATIENT)
Dept: OBSTETRICS AND GYNECOLOGY | Age: 54
End: 2024-08-28
Payer: COMMERCIAL

## 2024-08-28 RX ORDER — ESTRADIOL 0.1 MG/G
CREAM VAGINAL
Qty: 42.5 G | Refills: 3 | Status: SHIPPED | OUTPATIENT
Start: 2024-08-28

## 2024-08-28 NOTE — TELEPHONE ENCOUNTER
Pt called stating she is needing a topical low dose vaginal estrogen that has been discussed before.     Please advise

## 2024-10-17 ENCOUNTER — TELEPHONE (OUTPATIENT)
Dept: OBSTETRICS AND GYNECOLOGY | Age: 54
End: 2024-10-17
Payer: COMMERCIAL

## 2024-10-17 NOTE — TELEPHONE ENCOUNTER
Madelin , patient is asking for a new RX for progesterone for 100 mg called into [facility] pharmacy. She was previously on 50 mg , is it ok to increase it ?

## 2024-10-18 NOTE — TELEPHONE ENCOUNTER
Pt called back and states she never took Provera, she has always been on progesterone 100mg Dr. Keith prescribed her. Please advise.

## 2024-12-13 ENCOUNTER — TELEPHONE (OUTPATIENT)
Dept: OBSTETRICS AND GYNECOLOGY | Age: 54
End: 2024-12-13

## 2024-12-13 DIAGNOSIS — Z12.31 VISIT FOR SCREENING MAMMOGRAM: Primary | ICD-10-CM

## 2025-02-03 ENCOUNTER — OFFICE VISIT (OUTPATIENT)
Dept: OBSTETRICS AND GYNECOLOGY | Age: 55
End: 2025-02-03
Payer: COMMERCIAL

## 2025-02-03 VITALS
WEIGHT: 108 LBS | DIASTOLIC BLOOD PRESSURE: 68 MMHG | SYSTOLIC BLOOD PRESSURE: 104 MMHG | BODY MASS INDEX: 18.44 KG/M2 | HEIGHT: 64 IN

## 2025-02-03 DIAGNOSIS — N63.21 MASS OF UPPER OUTER QUADRANT OF LEFT BREAST: ICD-10-CM

## 2025-02-03 DIAGNOSIS — Z79.890 HORMONE REPLACEMENT THERAPY (HRT): ICD-10-CM

## 2025-02-03 DIAGNOSIS — Z12.4 SCREENING FOR MALIGNANT NEOPLASM OF CERVIX: ICD-10-CM

## 2025-02-03 DIAGNOSIS — Z01.419 WELL FEMALE EXAM WITH ROUTINE GYNECOLOGICAL EXAM: Primary | ICD-10-CM

## 2025-02-03 DIAGNOSIS — N95.0 POSTMENOPAUSAL BLEEDING: ICD-10-CM

## 2025-02-03 DIAGNOSIS — Z11.51 SCREENING FOR HUMAN PAPILLOMAVIRUS (HPV): ICD-10-CM

## 2025-02-03 NOTE — PROGRESS NOTES
Subjective     History of Present Illness    Chief Complaint   Patient presents with    Gynecologic Exam     Ae- last pap 2022 Neg, Hpv Neg, mammo 2023 c/o spotting on/off for 6 months now       Elizabeth García is a 54 y.o. female  who presents for annual exam.  C/o postmenopausal bleeding x 6 months.  States she has had spotting or full menstrual bleeding monthly for the past 6 months.  She took a GLP in March to 2024 and lost 20 pounds.  The bleeding began after this.  She went through menopause at 47 y/o.  She takes progesterone 200 mg daily, estradiol 0.1 mg patch twice weekly, and estradiol vaginal cream to [email or web address]. States bleeding was synced up to her youngest daughter's menses and would have bleeding when missing progesterone pills.   Pap smear today. Last pap smear in  was normal/negative. No hx abnormal pap.   Due for screening mammogram, order active.   Colonoscopy utd, pt reports was normal 2 years ago. Repeat 10 yrs.   DEXA utd, normal 2024.       Relevant data reviewed:  IGP, Apt HPV,rfx 16 / 18,45 (2022 12:15)   Mammo screening digital tomosynthesis bilateral w CAD (2023 07:55)   DEXA Bone Density Axial (2024 13:28)     Obstetric History:  OB History          3    Para   2    Term   2            AB        Living   3         SAB        IAB        Ectopic        Molar        Multiple        Live Births   3               Menstrual History:     Patient's last menstrual period was 10/16/2019 (exact date).           Current contraception: post menopausal status  History of abnormal Pap smear: no  Received Gardasil immunization: no  Perform regular self breast exam: yes -    Family history of uterine or ovarian cancer: no  Family History of colon cancer: yes - PGM  Family history of breast cancer: no    PAP: done today  Mammogram: ordered  Colonoscopy: up to date - pt reports normal 2 yrs ago, repeat 10 yrs  DEXA: up to  "date - normal in 2024    Exercise: very active  Calcium/Vitamin D: adequate intake    The following portions of the patient's history were reviewed and updated as appropriate: allergies, current medications, past family history, past medical history, past social history, past surgical history, and problem list.    Review of Systems  Pertinent items are noted in HPI.       Objective   Physical Exam  Chest:          Comments: Mass palpated on L breast between 1 to 2 o'clock position that is soft, mobile, non-tender, about 2 cm in size.  No nipple discharge or bleeding.  No skin changes or swelling.  No pain or tenderness. R breast exam normal.        /68   Ht 162.6 cm (64\")   Wt 49 kg (108 lb)   LMP 10/16/2019 (Exact Date)   BMI 18.54 kg/m²      General: alert, appears stated age, cooperative, and no distress   Heart: regular rate and rhythm, S1, S2 normal, no murmur, click, rub or gallop   Lungs: clear to auscultation bilaterally   Abdomen: soft, non-tender, without masses or organomegaly   Breast: inspection negative, no nipple discharge or bleeding, no masses or nodularity palpable and L breast mass - see image above   External genitalia/Vulva: External genitalia including bartholin's glands, Urethra, Masthope's gland and urethra meatus are normal and Bladder appears normal without significant prolapse    Vagina: normal mucosa, normal discharge   Cervix: no lesions   Uterus: normal size and non-tender   Adnexa: normal adnexa and no mass, fullness, tenderness   Neurologic: Alert and Oriented x3   Psychiatric: Normal affect, judgement, and mood     Transvaginal ultrasound 2/3/2025:  Impression: ** Preliminary Reading ** This report has not been finalized by a physician      1.  Uterus: Normal size, with uterine volume of 74.95 ml, and Anteverted     2.  Endometrium: Normal postmenopausal thickness, 3.69 mm, and Echogenic foci suspect for endometrial polyp     3.  Myometrium: Normal homogenous texture     4.  " Ovaries  Left:    Normal/unremarkable  Right:  Normal/unremarkable    Assessment & Plan   Diagnoses and all orders for this visit:    1. Well female exam with routine gynecological exam (Primary)  -     IGP, Apt HPV,rfx 16 / 18,45    2. Screening for human papillomavirus (HPV)  -     IGP, Apt HPV,rfx 16 / 18,45    3. Screening for malignant neoplasm of cervix  -     IGP, Apt HPV,rfx 16 / 18,45    4. Mass of upper outer quadrant of left breast  -     Mammo Diagnostic Digital Tomosynthesis Bilateral With CAD; Future  -     US Breast Left Limited; Future    5. Postmenopausal bleeding    6. Hormone replacement therapy (HRT)          PAP smear with HPV co-testing completed today  L breast mass - reviewed physical exam findings with pt. Bilateral diagnostic mammogram and L breast US ordered.  Will call patient with results and treat accordingly.   All questions answered.  Breast self exam technique reviewed and patient encouraged to perform self-exam monthly.  Physical activity and regular exercise encouraged.  Discussed healthy lifestyle modifications.  Discussed calcium and vitamin D needs to prevent osteoporosis.  -- Postmenopausal bleeding: After patient reported bleeding, pelvic ultrasound was completed after physical exam.  Ultrasound shows normal postmenopausal endometrium thickness of 3.69 mm.  There is an echogenic area in the endometrium that could be a polyp, this was not identified by the ultrasonographer.  Reviewed these findings with patient, endometrial biopsy not indicated at this time.  Informed patient I will send the note to Dr. Keith to see if she agrees that this is a polyp causing the bleeding or if she needs to have an adjustment in her HRT medications.  Patient verbalized understanding and is agreeable to plan.    Return in about 1 year (around 2/3/2026) for Annual Exam.

## 2025-02-05 ENCOUNTER — APPOINTMENT (OUTPATIENT)
Dept: WOMENS IMAGING | Facility: HOSPITAL | Age: 55
End: 2025-02-05
Payer: COMMERCIAL

## 2025-02-05 ENCOUNTER — TELEPHONE (OUTPATIENT)
Dept: SURGERY | Facility: CLINIC | Age: 55
End: 2025-02-05
Payer: COMMERCIAL

## 2025-02-05 PROCEDURE — 77062 BREAST TOMOSYNTHESIS BI: CPT | Performed by: RADIOLOGY

## 2025-02-05 PROCEDURE — 76642 ULTRASOUND BREAST LIMITED: CPT | Performed by: RADIOLOGY

## 2025-02-05 PROCEDURE — 77066 DX MAMMO INCL CAD BI: CPT | Performed by: RADIOLOGY

## 2025-02-05 PROCEDURE — G0279 TOMOSYNTHESIS, MAMMO: HCPCS | Performed by: RADIOLOGY

## 2025-02-05 NOTE — TELEPHONE ENCOUNTER
Elizabeth called today and asked if I could be involved with her breast imaging and review this with her as a patient.

## 2025-02-06 ENCOUNTER — TELEPHONE (OUTPATIENT)
Dept: OBSTETRICS AND GYNECOLOGY | Age: 55
End: 2025-02-06
Payer: COMMERCIAL

## 2025-02-06 ENCOUNTER — TELEPHONE (OUTPATIENT)
Dept: SURGERY | Facility: CLINIC | Age: 55
End: 2025-02-06
Payer: COMMERCIAL

## 2025-02-06 DIAGNOSIS — N64.89 BREAST ASYMMETRY: Primary | ICD-10-CM

## 2025-02-06 LAB
CYTOLOGIST CVX/VAG CYTO: NORMAL
CYTOLOGY CVX/VAG DOC CYTO: NORMAL
CYTOLOGY CVX/VAG DOC THIN PREP: NORMAL
DX ICD CODE: NORMAL
HPV I/H RISK 4 DNA CVX QL PROBE+SIG AMP: NEGATIVE
OTHER STN SPEC: NORMAL
SERVICE CMNT-IMP: NORMAL
STAT OF ADQ CVX/VAG CYTO-IMP: NORMAL

## 2025-02-06 NOTE — TELEPHONE ENCOUNTER
Spoke with patient  Reviewed her mammogram  Patient has a stereotactic biopsy ordered  It is scheduled at the end of the month  We also discussed her bleeding on hormone replacement  She is actually having a normal period once a month  She feels like it is a normal.  She is taking her progesterone regularly  I reviewed her ultrasound which I find very reassuring  Her lining is very thin  I recommend that we follow-up an ultrasound in 2 months  If at that time her lining is suspicious at all we can do a biopsy  The patient is agreeable to this plan

## 2025-02-06 NOTE — TELEPHONE ENCOUNTER
2/6/2025 I placed order for L breast stereotatic biopsy and pt already has appt at [facility] on 2/12/25 at 9:15am.

## 2025-02-06 NOTE — TELEPHONE ENCOUNTER
Lvm informing the patient that the Lt. Stereo biopsy has been scheduled for 02/12/25 @ 9:15 am, arriving at 9:00 am with [facility].    Asked patient to callback if she has any questions.

## 2025-02-24 ENCOUNTER — APPOINTMENT (OUTPATIENT)
Dept: OTHER | Facility: HOSPITAL | Age: 55
End: 2025-02-24
Payer: COMMERCIAL

## 2025-02-24 ENCOUNTER — HOSPITAL ENCOUNTER (OUTPATIENT)
Dept: MAMMOGRAPHY | Facility: HOSPITAL | Age: 55
Discharge: HOME | End: 2025-02-24
Payer: COMMERCIAL

## 2025-02-24 ENCOUNTER — APPOINTMENT (OUTPATIENT)
Dept: WOMENS IMAGING | Facility: HOSPITAL | Age: 55
End: 2025-02-24
Payer: COMMERCIAL

## 2025-02-24 ENCOUNTER — LAB REQUISITION (OUTPATIENT)
Dept: LAB | Facility: HOSPITAL | Age: 55
End: 2025-02-24
Payer: COMMERCIAL

## 2025-02-24 DIAGNOSIS — N64.89 BREAST ASYMMETRY: ICD-10-CM

## 2025-02-24 DIAGNOSIS — N64.89 OTHER SPECIFIED DISORDERS OF BREAST: ICD-10-CM

## 2025-02-24 PROCEDURE — C1819 TISSUE LOCALIZATION-EXCISION: HCPCS | Performed by: RADIOLOGY

## 2025-02-24 PROCEDURE — 19081 BX BREAST 1ST LESION STRTCTC: CPT | Performed by: RADIOLOGY

## 2025-02-24 PROCEDURE — A4648 IMPLANTABLE TISSUE MARKER: HCPCS | Performed by: RADIOLOGY

## 2025-02-24 PROCEDURE — 76098 X-RAY EXAM SURGICAL SPECIMEN: CPT

## 2025-02-24 PROCEDURE — 88305 TISSUE EXAM BY PATHOLOGIST: CPT | Performed by: OBSTETRICS & GYNECOLOGY

## 2025-02-26 ENCOUNTER — TELEPHONE (OUTPATIENT)
Dept: SURGERY | Facility: CLINIC | Age: 55
End: 2025-02-26
Payer: COMMERCIAL

## 2025-02-26 LAB
CYTO UR: NORMAL
DX PRELIMINARY: NORMAL
LAB AP CASE REPORT: NORMAL
LAB AP CLINICAL INFORMATION: NORMAL
LAB AP DIAGNOSIS COMMENT: NORMAL
PATH REPORT.FINAL DX SPEC: NORMAL
PATH REPORT.GROSS SPEC: NORMAL

## 2025-02-26 NOTE — TELEPHONE ENCOUNTER
2-24-25 stereo biopsy pathology returned as benign. I have notified patient and she tells me that Dr Keith is aware as well.        Clinical Information P   Left breast upper outer quadrant stereotactic biopsy, for focal asymmetry.  BI-RADS 4B.   Preliminary Diagnosis   1.  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  -Microcalcifications present associated with benign ducts.                             -No atypical hyperplasia, in situ nor invasive carcinoma identified.     [facility]

## 2025-02-27 DIAGNOSIS — N63.21 MASS OF UPPER OUTER QUADRANT OF LEFT BREAST: Primary | ICD-10-CM

## 2025-06-10 ENCOUNTER — TELEPHONE (OUTPATIENT)
Dept: OBSTETRICS AND GYNECOLOGY | Age: 55
End: 2025-06-10
Payer: COMMERCIAL

## 2025-06-10 RX ORDER — PROGESTERONE 100 MG/1
200 CAPSULE ORAL DAILY
Qty: 90 CAPSULE | Refills: 3 | Status: SHIPPED | OUTPATIENT
Start: 2025-06-10 | End: 2025-06-10

## 2025-06-10 RX ORDER — PROGESTERONE 100 MG/1
200 CAPSULE ORAL DAILY
Qty: 180 CAPSULE | Refills: 3 | Status: SHIPPED | OUTPATIENT
Start: 2025-06-10 | End: 2025-06-10 | Stop reason: SDUPTHER

## 2025-06-10 RX ORDER — PROGESTERONE 100 MG/1
200 CAPSULE ORAL DAILY
Qty: 180 CAPSULE | Refills: 3 | Status: SHIPPED | OUTPATIENT
Start: 2025-06-10

## 2025-06-10 NOTE — TELEPHONE ENCOUNTER
Please call [facility] to make sure they got the epic script  Progesterone compounded 100 mg, 2 daily (200 mg)

## 2025-06-10 NOTE — TELEPHONE ENCOUNTER
Patient last saw Anshul Montanez 2/3/2025 and [facility] has been added to patient chart.

## 2025-06-10 NOTE — TELEPHONE ENCOUNTER
Caller: Elizabeth García    Relationship: Self    Best call back number: [phone]      Requested Prescriptions: 100 MG COMPOUND PROGESTERONE CAPSULES - PT NEEDS ENOUGH TO TAKE UP TO 2 CAPSULES PER DAY  Requested Prescriptions      No prescriptions requested or ordered in this encounter        Pharmacy where request should be sent:    [facility]   Last office visit with prescribing clinician: Visit date not found   Last telemedicine visit with prescribing clinician: Visit date not found   Next office visit with prescribing clinician: 2/13/2026     Additional details provided by patient: PT IS OUT OF MEDICATION     Does the patient have less than a 3 day supply:  [x] Yes  [] No    Would you like a call back once the refill request has been completed: [] Yes [x] No    If the office needs to give you a call back, can they leave a voicemail: [] Yes [x] No    Rosio Wagner Rep   06/10/25 11:31 EDT

## 2025-06-10 NOTE — TELEPHONE ENCOUNTER
Rx sent to [facility] compound[facility] pharmacy. Resending Rx to [facility].

## 2025-06-11 NOTE — TELEPHONE ENCOUNTER
It was originally sent to the wrong compounding pharmacy. They added [facility] to her chart instead of [facility]. I resent it to correct pharmacy & called them yesterday to make sure they received it & had no questions. They were getting it ready for her.

## 2025-07-29 RX ORDER — ESTRADIOL 0.1 MG/D
FILM, EXTENDED RELEASE TRANSDERMAL
Qty: 24 PATCH | Refills: 3 | Status: SHIPPED | OUTPATIENT
Start: 2025-07-29

## 2025-07-29 RX ORDER — VALACYCLOVIR HYDROCHLORIDE 1 G/1
1000 TABLET, FILM COATED ORAL DAILY
Qty: 30 TABLET | Refills: 12 | Status: SHIPPED | OUTPATIENT
Start: 2025-07-29

## 2025-08-28 RX ORDER — ESTRADIOL 0.1 MG/G
CREAM VAGINAL
Qty: 42.5 G | Refills: 3 | Status: SHIPPED | OUTPATIENT
Start: 2025-08-28